# Patient Record
Sex: FEMALE | Race: BLACK OR AFRICAN AMERICAN | NOT HISPANIC OR LATINO | Employment: FULL TIME | URBAN - METROPOLITAN AREA
[De-identification: names, ages, dates, MRNs, and addresses within clinical notes are randomized per-mention and may not be internally consistent; named-entity substitution may affect disease eponyms.]

---

## 2023-07-31 ENCOUNTER — APPOINTMENT (OUTPATIENT)
Dept: PRIMARY CARE | Facility: CLINIC | Age: 38
End: 2023-07-31
Payer: COMMERCIAL

## 2023-10-19 ENCOUNTER — OFFICE VISIT (OUTPATIENT)
Dept: PRIMARY CARE | Facility: CLINIC | Age: 38
End: 2023-10-19
Payer: COMMERCIAL

## 2023-10-19 VITALS
TEMPERATURE: 97.8 F | SYSTOLIC BLOOD PRESSURE: 103 MMHG | BODY MASS INDEX: 27.98 KG/M2 | DIASTOLIC BLOOD PRESSURE: 71 MMHG | WEIGHT: 184 LBS | OXYGEN SATURATION: 97 % | HEART RATE: 86 BPM

## 2023-10-19 DIAGNOSIS — K58.9 IRRITABLE BOWEL SYNDROME, UNSPECIFIED TYPE: Primary | ICD-10-CM

## 2023-10-19 PROBLEM — F32.A DEPRESSION: Status: RESOLVED | Noted: 2023-10-19 | Resolved: 2023-10-19

## 2023-10-19 PROCEDURE — 1036F TOBACCO NON-USER: CPT | Performed by: STUDENT IN AN ORGANIZED HEALTH CARE EDUCATION/TRAINING PROGRAM

## 2023-10-19 PROCEDURE — 99213 OFFICE O/P EST LOW 20 MIN: CPT | Performed by: STUDENT IN AN ORGANIZED HEALTH CARE EDUCATION/TRAINING PROGRAM

## 2023-10-19 RX ORDER — DICYCLOMINE HYDROCHLORIDE 10 MG/1
10 CAPSULE ORAL 4 TIMES DAILY PRN
Qty: 60 CAPSULE | Refills: 0 | Status: SHIPPED | OUTPATIENT
Start: 2023-10-19 | End: 2023-12-18

## 2023-10-19 RX ORDER — LEVONORGESTREL 52 MG/1
INTRAUTERINE DEVICE INTRAUTERINE
COMMUNITY
Start: 2019-08-12

## 2023-10-19 NOTE — PATIENT INSTRUCTIONS
Your blood work is up to date; no need for additional draw at this appointment    You are suffering from IBS. I have given you an extensive handout about this condition and how to treat it. I recommend trying a low FODMAP diet to see if this helps with your symptoms.  I have also prescribed bentyl.    We discussed vertigo at length today in the office.

## 2023-10-19 NOTE — PROGRESS NOTES
Subjective   Patient ID: Oneida Landa is a 38 y.o. female who presents for No chief complaint on file..    HPI   Re: IBS - presenting mainly today for c/f IBS. When eating food or consuming caffeine, she feels that her GI system is more noisy, crampy, and bloated compared to prior baseline. Occasional changes in stool caliber but nothing consistent with certain foods. No blood. No wt change. No NSAID use. No hx of abdominal surgery.    Re: vertigo - had a bout of vertigo when she cleaned out her ear with cold water. Back to baseline now.       Review of Systems    Objective   /71   Pulse 86   Temp 36.6 °C (97.8 °F)   Wt 83.5 kg (184 lb)   SpO2 97%   BMI 27.98 kg/m²     Physical Exam  Gen: well developed in NAD. AAO x3.  HEENT: NC/AT. Anicteric sclera, symmetric pupils. MMM no thrush.  Neck: Soft, supple. No LAD. No goiter.   CV: RRR nl s1s2 no m/r/g  Pulm: CTAB no w/r/r, good air exchange  GI: soft NTND. Normoactive bowel sounds.   Ext: WWP no edema  Neuro: II-XII grossly intact, nonfocal systemic findings  MSK: 5/5 strength b/l UE and LE  Gait: unremarkable    Assessment/Plan   # IBS  - dietary modifications recommended (low FODMAP diet)  - extensive discussion about supplements (fiber) as well as PRN OTCs  - PRN bentyl prescribed    # Vertigo: resolved  - conservative mgmt    # Health Maintenance  - routine blood work is current  - Colon Cancer Screening: Not yet indicated   - Mammogram: Not yet indicated   - DEXA: Not yet indicated   - Immunizations: declines flu

## 2024-04-16 ENCOUNTER — APPOINTMENT (OUTPATIENT)
Dept: PRIMARY CARE | Facility: CLINIC | Age: 39
End: 2024-04-16
Payer: COMMERCIAL

## 2024-09-17 ENCOUNTER — APPOINTMENT (OUTPATIENT)
Dept: OBSTETRICS AND GYNECOLOGY | Facility: CLINIC | Age: 39
End: 2024-09-17
Payer: COMMERCIAL

## 2024-09-17 VITALS
BODY MASS INDEX: 28.34 KG/M2 | HEIGHT: 68 IN | DIASTOLIC BLOOD PRESSURE: 68 MMHG | WEIGHT: 187 LBS | SYSTOLIC BLOOD PRESSURE: 110 MMHG

## 2024-09-17 DIAGNOSIS — Z01.419 ENCOUNTER FOR ANNUAL ROUTINE GYNECOLOGICAL EXAMINATION: Primary | ICD-10-CM

## 2024-09-17 DIAGNOSIS — R10.9 ABDOMINAL CRAMPING: ICD-10-CM

## 2024-09-17 PROCEDURE — 3008F BODY MASS INDEX DOCD: CPT | Performed by: OBSTETRICS & GYNECOLOGY

## 2024-09-17 PROCEDURE — 87624 HPV HI-RISK TYP POOLED RSLT: CPT

## 2024-09-17 PROCEDURE — 88175 CYTOPATH C/V AUTO FLUID REDO: CPT

## 2024-09-17 PROCEDURE — 99385 PREV VISIT NEW AGE 18-39: CPT | Performed by: OBSTETRICS & GYNECOLOGY

## 2024-09-17 PROCEDURE — 1036F TOBACCO NON-USER: CPT | Performed by: OBSTETRICS & GYNECOLOGY

## 2024-09-17 RX ORDER — DOXYCYCLINE 100 MG/1
100 CAPSULE ORAL
COMMUNITY
Start: 2024-09-10

## 2024-09-17 ASSESSMENT — PAIN SCALES - GENERAL: PAINLEVEL: 0-NO PAIN

## 2024-09-17 NOTE — PROGRESS NOTES
39-year-old -0-0-3 -American woman presents today for annual GYN exam.  She reports some intermittent cramping that is not debilitating but is noticeable.  She had a Mirena IUD placed in 2019.    Subjective   Patient ID: Oneida Landa is a 39 y.o. female who presents for Follow-up (Discuss IUD removal ).  HPI    Review of Systems    Objective   Physical Exam  Exam conducted with a chaperone present.   Constitutional:       Appearance: She is normal weight.   HENT:      Head: Normocephalic.      Right Ear: External ear normal.      Left Ear: External ear normal.      Nose: Nose normal.      Mouth/Throat:      Mouth: Mucous membranes are moist.   Eyes:      Extraocular Movements: Extraocular movements intact.      Pupils: Pupils are equal, round, and reactive to light.   Cardiovascular:      Rate and Rhythm: Normal rate and regular rhythm.      Heart sounds: Normal heart sounds.   Pulmonary:      Effort: Pulmonary effort is normal.      Breath sounds: Normal breath sounds.   Chest:   Breasts:     Right: Normal.      Left: Normal.   Abdominal:      Palpations: Abdomen is soft.   Genitourinary:     General: Normal vulva.      Labia:         Right: No rash or lesion.         Left: No rash or lesion.       Vagina: Normal.      Cervix: Normal. No cervical motion tenderness or lesion.      Uterus: Normal.       Adnexa: Right adnexa normal and left adnexa normal.      Comments: IUD string seen   Musculoskeletal:         General: Normal range of motion.      Cervical back: Normal range of motion and neck supple.   Skin:     General: Skin is warm and dry.   Neurological:      General: No focal deficit present.      Mental Status: She is alert and oriented to person, place, and time.   Psychiatric:         Mood and Affect: Mood normal.         Behavior: Behavior normal.     A/P: APE     -  Pap sent     -  Maintain IUD until     -  PCP F/U

## 2024-09-17 NOTE — PATIENT INSTRUCTIONS
Thanks for coming in today for your annual GYN exam.    A Pap smear was sent.  Results should be available in the next few weeks.    Your IUD was placed in 2019.  It may remain in place for 8 years until 2027.    Arrange to have an ultrasound performed to check your IUD placement and help evaluate your cramping.    Follow-up with your PCP and other healthcare specialist as needed.    Feel free to call the office with any problems, questions or concerns prior to your next scheduled visit.

## 2024-09-27 LAB
CYTOLOGY CMNT CVX/VAG CYTO-IMP: NORMAL
HPV HR 12 DNA GENITAL QL NAA+PROBE: NEGATIVE
HPV HR GENOTYPES PNL CVX NAA+PROBE: NEGATIVE
HPV16 DNA SPEC QL NAA+PROBE: NEGATIVE
HPV18 DNA SPEC QL NAA+PROBE: NEGATIVE
LAB AP CONTRACEPTIVE HISTORY: NORMAL
LAB AP HPV GENOTYPE QUESTION: YES
LAB AP HPV HR: NORMAL
LABORATORY COMMENT REPORT: NORMAL
PATH REPORT.TOTAL CANCER: NORMAL

## 2025-04-03 ENCOUNTER — HOSPITAL ENCOUNTER (OUTPATIENT)
Dept: RADIOLOGY | Facility: CLINIC | Age: 40
Discharge: HOME | End: 2025-04-03
Payer: COMMERCIAL

## 2025-04-03 ENCOUNTER — OFFICE VISIT (OUTPATIENT)
Dept: URGENT CARE | Age: 40
End: 2025-04-03
Payer: COMMERCIAL

## 2025-04-03 VITALS
DIASTOLIC BLOOD PRESSURE: 73 MMHG | SYSTOLIC BLOOD PRESSURE: 120 MMHG | TEMPERATURE: 98.4 F | OXYGEN SATURATION: 98 % | HEART RATE: 69 BPM | RESPIRATION RATE: 16 BRPM

## 2025-04-03 DIAGNOSIS — M54.50 ACUTE BILATERAL LOW BACK PAIN WITHOUT SCIATICA: Primary | ICD-10-CM

## 2025-04-03 DIAGNOSIS — M62.838 MUSCLE SPASM: ICD-10-CM

## 2025-04-03 DIAGNOSIS — M54.50 ACUTE BILATERAL LOW BACK PAIN WITHOUT SCIATICA: ICD-10-CM

## 2025-04-03 PROCEDURE — 72110 X-RAY EXAM L-2 SPINE 4/>VWS: CPT

## 2025-04-03 PROCEDURE — 99204 OFFICE O/P NEW MOD 45 MIN: CPT | Performed by: FAMILY MEDICINE

## 2025-04-03 PROCEDURE — 1036F TOBACCO NON-USER: CPT | Performed by: FAMILY MEDICINE

## 2025-04-03 RX ORDER — CYCLOBENZAPRINE HCL 10 MG
10 TABLET ORAL 3 TIMES DAILY PRN
Qty: 30 TABLET | Refills: 0 | Status: SHIPPED | OUTPATIENT
Start: 2025-04-03 | End: 2025-06-02

## 2025-04-03 RX ORDER — METHYLPREDNISOLONE 4 MG/1
TABLET ORAL
Qty: 21 TABLET | Refills: 0 | Status: SHIPPED | OUTPATIENT
Start: 2025-04-03 | End: 2025-04-09

## 2025-04-03 ASSESSMENT — PAIN SCALES - GENERAL: PAINLEVEL_OUTOF10: 9

## 2025-04-03 NOTE — PROGRESS NOTES
HPI:  Patient is here with her mother.  Pt states that she had intermittent pain in her lower back on and off for months.  Pt states that usually pain resolves with Motrin after a few days.  Pt states that this time pain started 6 days ago and has been persistent.  Pt states that motrin helps decrease the pain, but does not resolve it.  Pt states that pain is in the lower back, more on the left side.  No radiation to LE.  Pt denies bowel/bladder trouble.  Pt states that pain is 6/10 in severity.  No hx/o injury to the back.         ROS:  No bowel/bladder trouble  No numbness/weakness in LE    PE:    A&O x3  NCAT  PERRLA, EOMI  RRR  CTAB  +tndop over lumbar region with tension in the muscles and spine curvature to the left  MOEx4  No focal deficit  Judgement normal  Ms 5/5 UE/LE bl    Results:  Xray lumbar spine: pending    A/P:   Lower back pain  Back muscle spasm    We will call you with xray results if you need further treatment.  Ice.  Stretching.  Rest. Topical patches/Biofreeze.  Follow up with your doctor for further evaluation in 2 weeks if symptoms persist.  Discuss PT with your doctor.  Consider further imaging with MRI if indicated.  Keep a diary of symptoms.  Go to the ER if starts getting worse.

## 2025-04-03 NOTE — LETTER
April 3, 2025     Patient: Oneida Landa   YOB: 1985   Date of Visit: 4/3/2025       To Whom It May Concern:    Oneida Landa was seen in my clinic on 4/3/2025 at 10:50 am. Please excuse Oneida for her absence from work on this day to make the appointment. Oneida may return  on 04/05/2025.    If you have any questions or concerns, please don't hesitate to call.         Sincerely,         Areli Portillo MD        CC: No Recipients

## 2025-04-09 ENCOUNTER — APPOINTMENT (OUTPATIENT)
Dept: PRIMARY CARE | Facility: CLINIC | Age: 40
End: 2025-04-09
Payer: COMMERCIAL

## 2025-04-09 VITALS
TEMPERATURE: 97.3 F | OXYGEN SATURATION: 91 % | HEART RATE: 74 BPM | DIASTOLIC BLOOD PRESSURE: 77 MMHG | HEIGHT: 68 IN | BODY MASS INDEX: 26.22 KG/M2 | WEIGHT: 173 LBS | SYSTOLIC BLOOD PRESSURE: 122 MMHG

## 2025-04-09 DIAGNOSIS — M54.42 ACUTE LEFT-SIDED LOW BACK PAIN WITH LEFT-SIDED SCIATICA: Primary | ICD-10-CM

## 2025-04-09 DIAGNOSIS — E55.9 VITAMIN D DEFICIENCY: ICD-10-CM

## 2025-04-09 DIAGNOSIS — Z00.00 HEALTHCARE MAINTENANCE: ICD-10-CM

## 2025-04-09 PROCEDURE — 3008F BODY MASS INDEX DOCD: CPT | Performed by: STUDENT IN AN ORGANIZED HEALTH CARE EDUCATION/TRAINING PROGRAM

## 2025-04-09 PROCEDURE — 1036F TOBACCO NON-USER: CPT | Performed by: STUDENT IN AN ORGANIZED HEALTH CARE EDUCATION/TRAINING PROGRAM

## 2025-04-09 PROCEDURE — 99214 OFFICE O/P EST MOD 30 MIN: CPT | Performed by: STUDENT IN AN ORGANIZED HEALTH CARE EDUCATION/TRAINING PROGRAM

## 2025-04-09 ASSESSMENT — PAIN SCALES - GENERAL: PAINLEVEL_OUTOF10: 2

## 2025-04-09 NOTE — PROGRESS NOTES
"Subjective   Patient ID: Oneida Landa is a 39 y.o. female who presents for No chief complaint on file..  History of Present Illness  The patient, an active individual training for a half marathon, presents with recurring back and sciatic nerve pain. The most recent episode occurred six days ago after a period of extensive travel involving long periods of sitting. The patient was bending over to  toys when she felt a 'pop' in her back, followed by tightness and spasms. The pain was severe enough to warrant a visit to urgent care, where an x-ray revealed mild to moderate lumbar levo scoliosis. The patient was prescribed an anti-inflammatory and a muscle relaxer. The patient also reports a lump in her breast, which she discovered while on vacation. She has an upcoming appointment with her gynecologist to address this concern.      PMHx, FHx, Social Hx, Surg Hx personally reviewed at this appointment. No pertinent findings and/or changes from prior (if applicable).    ROS: Unless specified above, pt denies wt gain/loss f/c HA LoC CP SOB NVDC. See HPI above, and scanned sheet (if applicable). All other systems are reviewed and are without complaint.     Objective     /77   Pulse 74   Temp 36.3 °C (97.3 °F)   Ht 1.727 m (5' 8\")   Wt 78.5 kg (173 lb)   SpO2 91%   BMI 26.30 kg/m²      Physical Exam  CHEST: Lungs clear to auscultation bilaterally.  CARDIOVASCULAR: Heart sounds normal.  MUSCULOSKELETAL: Back and hamstring tightness noted.      Lab Results   Component Value Date    WBC 5.4 05/31/2022    HGB 12.9 05/31/2022    HCT 40.4 05/31/2022     05/31/2022    CHOL 147 05/31/2022    TRIG 57 05/31/2022    HDL 41.7 05/31/2022    ALT 13 05/31/2022    AST 13 05/31/2022     05/31/2022    K 4.2 05/31/2022     05/31/2022    CREATININE 0.87 05/31/2022    BUN 9 05/31/2022    CO2 26 05/31/2022    TSH 0.58 05/31/2022    HGBA1C 4.6 12/11/2018     par     Current Outpatient Medications "   Medication Instructions    cyclobenzaprine (FLEXERIL) 10 mg, oral, 3 times daily PRN    doxycycline (VIBRAMYCIN) 100 mg, 2 times daily (morning and late afternoon)    levonorgestrel (Mirena) 21 mcg/24 hours (8 yrs) 52 mg IUD intrauterine    methylPREDNISolone (Medrol Dospak) 4 mg tablets Follow schedule on package instructions        XR lumbar spine complete 4+ views  Narrative: Interpreted By:  Xochilt Morales,   STUDY:  XR LUMBAR SPINE COMPLETE 4+ VIEWS; ;  4/3/2025 11:37 am      INDICATION:  Signs/Symptoms:lower back pain, no hx/o injury.      ,M54.50 Low back pain, unspecified      COMPARISON:  None.      ACCESSION NUMBER(S):  [id number]      ORDERING CLINICIAN:  ELIZABET BARCLAY      FINDINGS:  No acute fracture or malalignment of the lumbar spine. Vertebral body  heights are preserved. Mild-to-moderate lumbar levoscoliotic  curvature noted, centered at L2-L4. No significant degenerative  changes are seen.      IUD projects of the pelvis. Visualized bowel gas pattern is  unremarkable.      Impression: 1. No evidence of acute bony abnormality of the lumbar spine.      2. Mild-to-moderate lumbar levo scoliosis.      MACRO:  None      Signed by: Xochilt Morales 4/3/2025 12:13 PM  Dictation workstation:   [id number]           Assessment & Plan  Acute low back pain with sciatica  Acute left-sided low back pain with sciatica, likely due to muscle spasm from improper lifting or twisting. X-ray showed mild to moderate lumbar levoscoliosis without bony abnormalities. Conservative treatment recommended.  - Continue conservative treatment with heat, ice, massage, and stretching.  - Refer to physical therapy for core strengthening exercises.  - Use Medrol Dosepak as prescribed for inflammation.  - Use Flexeril (cyclobenzaprine) as needed for muscle relaxation, particularly in the evening to aid sleep.  - Advise on proper footwear and stretching before running, especially in preparation for the upcoming half  marathon.    Breast lump  Reports finding a breast lump. Appointment with gynecologist scheduled for further evaluation.  - Follow up with gynecologist for evaluation of breast lump and potential mammogram.    Routine health maintenance  Routine health maintenance due. Last blood work was a couple of years ago.  - Order routine blood work including CBC, CMP, lipid panel, and vitamin D levels.           Ernie Ring MD       This medical note was created with the assistance of artificial intelligence (AI) for documentation purposes. The content has been reviewed and confirmed by the healthcare provider for accuracy and completeness. Patient consented to the use of audio recording and use of AI during their visit.

## 2025-04-09 NOTE — PATIENT INSTRUCTIONS
Please stop at any  lab ([address] if you choose to use my building) to complete your blood and/or urine work that I've ordered for you.    I will contact you with the results at my soonest convenience. I strongly urge you to use [facility] as this is the quickest and easiest way to access your results and receive my correspondences.     You are dealing with a back strain. The mainstay of treatment is conservative management with ice/heat, stretching, physical therapy, and acupuncture or massage therapy. Given the degree of pain you're having, I may have provided you with a short course of steroids or a small supply of a muscle relaxant. There is likely no role for imaging (x-ray, CT scan, MRI, etc...) at this time; if you aren't better with the above treatments, return to clinic and I will reconsider.

## 2025-04-17 ENCOUNTER — APPOINTMENT (OUTPATIENT)
Dept: OBSTETRICS AND GYNECOLOGY | Facility: CLINIC | Age: 40
End: 2025-04-17
Payer: COMMERCIAL

## 2025-04-22 ENCOUNTER — APPOINTMENT (OUTPATIENT)
Dept: OBSTETRICS AND GYNECOLOGY | Facility: CLINIC | Age: 40
End: 2025-04-22
Payer: COMMERCIAL

## 2025-05-20 ENCOUNTER — APPOINTMENT (OUTPATIENT)
Dept: OBSTETRICS AND GYNECOLOGY | Facility: CLINIC | Age: 40
End: 2025-05-20
Payer: COMMERCIAL

## 2025-06-24 ENCOUNTER — APPOINTMENT (OUTPATIENT)
Dept: OBSTETRICS AND GYNECOLOGY | Facility: CLINIC | Age: 40
End: 2025-06-24
Payer: COMMERCIAL

## 2025-06-24 VITALS — SYSTOLIC BLOOD PRESSURE: 110 MMHG | DIASTOLIC BLOOD PRESSURE: 70 MMHG | BODY MASS INDEX: 26.46 KG/M2 | WEIGHT: 174 LBS

## 2025-06-24 DIAGNOSIS — Z12.31 ENCOUNTER FOR SCREENING MAMMOGRAM FOR MALIGNANT NEOPLASM OF BREAST: ICD-10-CM

## 2025-06-24 DIAGNOSIS — N63.24 MASS OF LOWER INNER QUADRANT OF LEFT BREAST: Primary | ICD-10-CM

## 2025-06-24 DIAGNOSIS — N60.19 FIBROCYSTIC BREAST CHANGES, UNSPECIFIED LATERALITY: ICD-10-CM

## 2025-06-24 PROCEDURE — 99213 OFFICE O/P EST LOW 20 MIN: CPT | Performed by: OBSTETRICS & GYNECOLOGY

## 2025-06-24 ASSESSMENT — PAIN SCALES - GENERAL: PAINLEVEL_OUTOF10: 0-NO PAIN

## 2025-06-24 NOTE — PATIENT INSTRUCTIONS
Thanks for coming in today for follow-up.    Arrange to have a mammogram performed.    Return to the office for your annual GYN exam or as needed.

## 2025-06-24 NOTE — PROGRESS NOTES
Assessment and Plan:  Oneida Landa is a 41 y/o  woman presenting today for follow up.     Diagnoses:  #1 Mass of lower inner quadrant of left breast  #2 Fibrocystic breast changes  #3 Breast cancer screening    Assessment/Plan:  1. Breast mass palpated by patient  - Diagnostic left mammogram ordered.  - Baseline mammogram ordered.  - Return to the office in September for annual GYN exam or PRN.  - Follow up with PCP and other healthcare specialists PRN.    Follow up with Dr. Hudson.    Neelibe Attestation  By signing my name below, I, Arianna Karly Kemp, attest that this documentation has been prepared under the direction and in the presence of Keerthi Hduson MD on 2025 at 6:12 PM.     HPI:   Oneida Landa is a 41 y/o  woman presenting today for follow up. She reports noticing a small lump in the left breast in 2025. She describes the lump as non-tender and sometimes difficult to feel. She denies any history of trauma to the area.    ROS:  Review of Systems   Genitourinary:         Positive for left breast mass.     Physical Exam:   Physical Exam  Constitutional:       General: She is not in acute distress.     Appearance: Normal appearance.   Genitourinary:   Breasts:     Right: No inverted nipple, mass, nipple discharge or skin change.      Left: No inverted nipple, mass, nipple discharge or skin change.      Breast exam comments: There is some fibrocystic change, left greater than right, and at the 7 o'clock position in the left breast near the sternum, there is some increased fullness/fibrocystic change..  Lymphadenopathy:      Upper Body:      Right upper body: No axillary adenopathy.      Left upper body: No axillary adenopathy.   Neurological:      Mental Status: She is alert and oriented to person, place, and time.      Comments: Pleasant and cooperative